# Patient Record
(demographics unavailable — no encounter records)

---
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
## 2025-01-07 NOTE — DISCUSSION/SUMMARY
[FreeTextEntry1] : Health Maintenance: pap neg 7/24, repeat in future. Never HPV postiive nor colpo  Conception: PNV now  Migraines prior to menses starts behind ears and goes to front of head uses Mag and Naproxen for relief

## 2025-07-08 NOTE — PHYSICAL EXAM
[Appropriately responsive] : appropriately responsive [Alert] : alert [No Acute Distress] : no acute distress [No Lymphadenopathy] : no lymphadenopathy [No Murmurs] : no murmurs [Soft] : soft [Non-tender] : non-tender [Oriented x3] : oriented x3

## 2025-07-08 NOTE — DISCUSSION/SUMMARY
[FreeTextEntry1] : Pre-conceptional counseling - Discussed menstrual cycle and ovulation - Pelvic US rx, she will have US CD 7-10 - Check TSH/T4 (FHx of Hashimoto's)
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
